# Patient Record
Sex: OTHER/UNKNOWN | Race: WHITE
[De-identification: names, ages, dates, MRNs, and addresses within clinical notes are randomized per-mention and may not be internally consistent; named-entity substitution may affect disease eponyms.]

---

## 2020-10-01 ENCOUNTER — HOSPITAL ENCOUNTER (EMERGENCY)
Dept: HOSPITAL 54 - ER | Age: 50
Discharge: HOME | End: 2020-10-01
Payer: MEDICAID

## 2020-10-01 VITALS — HEIGHT: 70 IN | BODY MASS INDEX: 21.19 KG/M2 | WEIGHT: 148 LBS

## 2020-10-01 VITALS — SYSTOLIC BLOOD PRESSURE: 139 MMHG | DIASTOLIC BLOOD PRESSURE: 99 MMHG
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
## 2020-10-01 DIAGNOSIS — S91.332A: Primary | ICD-10-CM

## 2020-10-01 DIAGNOSIS — R00.0: ICD-10-CM

## 2020-10-01 NOTE — NUR
SW called  Marisol Evans [phone] and sister Buddy Duron [phone]. 
Provided update on patient. Patient to contact  Marisol Evans [phone] for 
long-term housing placement. 



Patient in understanding of next steps.

## 2020-10-01 NOTE — NUR
SEEN AND EVALUATED. PROVIDED W/ WOUND CARE AND NEW SOCKS.  SEEN BY MITCHELL PERALES. 
DISCHARGE IN STABLE CONDITION.

## 2020-10-01 NOTE — NUR
This SW met with the patient at bedside. Patient reports that she is homeless but has a case 
manager Marisol Evans [phone]. Patient would like this SW to call   Marisol Evans [phone] and sister Buddy Duron [phone] to provide physical location 
update as patient does not have phone. 



Plan: SW to call both  Marisol Evans [phone] and sister Buddy Duron 
[phone]. SW to provide homeless resources packet.

## 2020-10-01 NOTE — NUR
SW provided the following resources to patient. 



Substance Abuse resources provided included: [facility]stance Abuse 
Self-Helpline ([facility]) [phone]; [facility] [address] [facility] [place] [address] 
[phone]; [facility] [address] [facility] [address] [phone]; 
[facility] [address] [facility] [id number] [phone]; [facility] [address] [facility] [id number] [phone];  [facility] [address] [facility] [address] [phone]; [facility] [address] [facility] [id number] [phone]; [facility] [phone]; [facility]  
[phone]; [facility] [phone] [facility]  [phone] [place]; [facility]  [phone] [place]; [facility] [place] [facility] 
[phone] [place]; [facility]  [phone] [place]; [facility] [phone] [place]; [facility] [phone] [place]; [facility], [facility]. 
[phone] [place]; Alcoholics Anonymous (564) 778-6108-SFV; [facility] [phone]; Marijuana Anonymous (662) 639-2497-SFV; Narcotics Anonymous www.na.org. 

Year-round shelters : [place] [address] [place] [address] [phone]; 
[facility] [place] [address] [place] [address]; [place] [facility] [facility] [address] [facility] [address] [phone]

Hygiene: [facility]: [address] [place] [phone]; [facility] 
[address] [facility]  [facility] [phone]; [facility] [address], [place] [phone].

Food Resources: [place] [facility] at [facility]- [address] 
[place]; [facility] Need [facility] ([facility]) [address] [place]; [facility] [address] [facility]; [facility] 
[address] [place].